# Patient Record
(demographics unavailable — no encounter records)

---

## 2024-10-09 NOTE — HISTORY OF PRESENT ILLNESS
[de-identified] : f/u pregnancy test [FreeTextEntry6] : 15 yo F with no significant pmh here today for f/u pregnancy test. She states 2 weeks ago came to clinic to receive plan B for sexual encounter (with protection-condom) 2 days prior. Today she received pregnancy test which was negative. Pt denies any fever, nausea, vomiting, abdominal pain, rash, or joint pain.   PMH: none Meds: none LMP: 2 weeks ago, regular, normal flow, 2 pads used Allergies: NKA Vaccs: UTD SHx: lives at home with parents, 3 siblings; has a shitzu; BF, exclusive - sexually active, uses barrier protection; non-smoking, no ETOH, no drug use; no recent travel; has not been tested for STI but interested in GC/Chlamydia testing on next visit.

## 2024-10-25 NOTE — HISTORY OF PRESENT ILLNESS
[FreeTextEntry6] : 16 year old female with cramps LMP: menses started today patient is sexually active with one male partner - uses condoms most times received EC 9/18/24 NKA no other concerns

## 2024-10-25 NOTE — DISCUSSION/SUMMARY
[FreeTextEntry1] : 16 year old female with dysmenorrhea dispensed Ibuprofen 2 tabs symptom management reviewed patient verbalizes understanding discharged stable back to class

## 2024-10-29 NOTE — HISTORY OF PRESENT ILLNESS
[FreeTextEntry1] : 16 y.o female presents to health center for Plan B Had unprotect sex with male partner last night  LMP 10/22/24 No additional complaints at this time   [Sexually Active] : ~he/she~ is sexually active [Monogamous] : is monogamous [Vaginal] : vaginal [Male ___] : [unfilled] male

## 2024-10-29 NOTE — ASSESSMENT
[FreeTextEntry1] : 16 y.o. female presents to [facility] for EC V/S stable  NKDA LMP 10/22/24 Counseling/education provided on EC, BC options and safe sex practice  Offered condoms declined Consent obtained for EC upreg, negative levonorgestrel 1.5mg PO given, tolerated  Answered all questions and concerns  F/U in 3 weeks for repeat upreg and to discuss BC options  Safe D/C to class

## 2024-11-19 NOTE — ASSESSMENT
[FreeTextEntry1] : 16 year old female for family planning repeat u preg negative consent on chart To quick start [facility] (one cycle labelled and dispensed) one tab po daily today condoms declined safe sexual practice counseling Nexplanon counseling with info sheet provided patient to f/u 11/22/24 with Dr. Milan for further counseling and to schedule appointment for Nexplanon placement patient verbalizes understanding

## 2024-11-19 NOTE — HISTORY OF PRESENT ILLNESS
[FreeTextEntry1] : 16 year old female for follow up Hx: patient received EC on 10/29/24 - for repeat u preg today in monogamous relationship with male partner - inconsistent condom use  no unprotected sex in past five days h/o EC use x 3 since 1/24 patient is also interested in Nexplanon parents are not aware of patient's sexual activity LMP: x 2 weeks ago menarche: 11 years cycle Q 28 days length 5-7 days med hx: non contributory no liver disease, no h/o blood clots, no h/o breast cancer NKA

## 2025-02-13 NOTE — HISTORY OF PRESENT ILLNESS
[de-identified] : 17 y.o. female here for STI/HIV testing.  Not recently sexually active but she had a discussion with our health educator yesterday and decided she would like to be tested.  On no birth control; currently abstinent/still deciding.  I review all of her contraceptive options with her.  At one point in the past, she was interested in Nexplanon but her interest has waned.  She is on no meds and has NKDA.  PHQ = 11, some cuts on arm but she said she isn't doing this now.  I offered her a consultation with our SW but she refused.  5 on the MARIAJOSE and CRAFFT = 0.

## 2025-04-24 NOTE — COUNSELING
[Contraception] : contraception [Emergency Contraception] : emergency contraception [Method Specific Consent] : method specific consent [Lab Results] : lab results [Safe Sexual Practices] : safe sexual practices

## 2025-04-25 NOTE — HISTORY OF PRESENT ILLNESS
[Definite:  ___ (Date)] : the last menstrual period was [unfilled] [Normal Amount/Duration] : was of a normal amount and duration [Sexually Active] : ~he/she~ is sexually active [Vaginal] : vaginal [Male ___] : [unfilled] male [FreeTextEntry1] : 17 y.o female presents to [facility] for EC and to discuss Nexplanon  Had unprotected sex on 4/23/25, denies coercion  LMP 4/13/25 RENA Bocanegra report no complains with Levonorgestrel in the pass    [Contraception] : does not use contraception

## 2025-04-25 NOTE — HISTORY OF PRESENT ILLNESS
[Definite:  ___ (Date)] : the last menstrual period was [unfilled] [Normal Amount/Duration] : was of a normal amount and duration [Sexually Active] : ~he/she~ is sexually active [Vaginal] : vaginal [Male ___] : [unfilled] male [FreeTextEntry1] : 17 y.o female presents to [facility] for EC and to discuss Nexplanon  Had unprotected sex on 4/23/25, denies coercion  LMP 4/13/25 RENA Bocanegra report no complains with Levonorgestrel in the pass    [Contraception] : does not use contraception

## 2025-04-25 NOTE — ASSESSMENT
[FreeTextEntry1] : 17 y.o female presents to [facility] for EC  V/S stable  NKDA LMP 4/13/25 Counseling/education provided on EC, BC options and safe sex practice  Offered condoms, declined Consent obtained for EC upreg, negative levonorgestrel 1.5mg PO given, tolerated  Answered all questions and concerns  Referred for additional LARC education to Camille health educator  Consultation with Dr. Hall tentative 4/30/25 for Nexplanon placement  F/U in 3 weeks for repeat upreg  Safe D/C to class

## 2025-04-25 NOTE — ASSESSMENT
[FreeTextEntry1] : 17 y.o female presents to [facility] for EC  V/S stable  NKDA LMP 4/13/25 Counseling/education provided on EC, BC options and safe sex practice  Offered condoms, declined Consent obtained for EC upreg, negative levonorgestrel 1.5mg PO given, tolerated  Answered all questions and concerns  Referred for additional LARC education to [facility] health educator  Consultation with Dr. Hall tentative 4/30/25 for Nexplanon placement  F/U in 3 weeks for repeat upreg  Safe D/C to class

## 2025-04-30 NOTE — HISTORY OF PRESENT ILLNESS
[de-identified] : 17 y.o. female here for pre-Nexplanon visit.  Pt has spoken to Camilel (HE) and numerous other staff members about Nexplanon over the past year or so.  She told me she ids committed to having it placed now.  I refviewed procedure with her, and how we would start with a pregnancy test.  LMP 2 weeks ago.  No unprotected sex since last period and stressed that she should continue that for at least 2 weeks after the Nexplanon is placed.  Pt will sign consent tomorrow and come in for 6th period (Noon) tomorrow for the procedure which I will perform.  All questions answered.

## 2025-05-01 NOTE — PHYSICAL EXAM
[NL] : soft, nontender, nondistended, normal bowel sounds, no hepatosplenomegaly [de-identified] : no calf tenderness

## 2025-05-01 NOTE — HISTORY OF PRESENT ILLNESS
[de-identified] : 17 y.o. femle here for Nexplanon Insertion.  Education provided yesterday and confirmed before today's procedure.  Informed consent, obtained and side-site veriication documemted by YVONNE Benavidez MA.  Pt to RTC on Monday, 5/5/25 for follow-up (pt is not expecting to be in school tomorrow). [FreeTextEntry6] : I have reviewed the following with the patient: 	The patient is not pregnant;  U preg negative today. 	The patient has no history of blood clots, such as blood clots in her legs (deep venous thrombosis), lungs (pulmonary embolism), eyes (total or partial blindness), heart (heart attack), or brain (stroke) that is not being treated; 	The patient has no liver disease or a liver tumor; 	The patient does not have unexplained vaginal bleeding that has not been evaluated by a clinician;  	The patient has no history of breast cancer or any other cancer that is sensitive to progestin (a female hormone), now or in the past;  	The patient has No Known Allergies that would be contraindicated for Nexplanon placement; 	I have reviewed all medications with the patient.   The patient's Left upper arm was palpated and marked.  The area was prepped with betadine and a sterile drape was placed. 4 ml of 1% lidoacaine was injected.      Nexplanon placement: the device was inserted without difficulty and palpated post insertion.  Steristrip and pressure dressing applied. Aftercare was discussed with the patient.          Nexplanon Lot #: [id number]                      Expiration Date: 11/30/2025  Orders: For documenting/ordering Nexplanon go to Orders -->Med Administration --> Type Nexplanon  Coding: CPT Code:   Insertion Code: 85682  E/M Codes:  Z30.09 Z30.017 Z32.02 Z30.8 Z70.9 Z71.9

## 2025-05-06 NOTE — HISTORY OF PRESENT ILLNESS
[FreeTextEntry1] : 17 y.o female presents to [facility] for follow up after Nexplanon placement  No complaints at this time  Removed bandage after 24 hrs Has not been sexually active in the last 5 days  LMP 5/1/25   [Sexually Active] : ~he/she~ is sexually active [Monogamous] : is monogamous [Contraception] : uses contraception [Vaginal] : vaginal [Male ___] : [unfilled] male

## 2025-05-06 NOTE — ASSESSMENT
[FreeTextEntry1] : 17 y.o presents to [facility] for follow up after Nexplanon placement V/S stable  No complaints at this time  Nexplanon palpated in left arm, minimum bruising noted  Counseling on safe sex practice  Answered all questions  Support provided  D/c to class, RTC as needed